# Patient Record
Sex: FEMALE | Race: WHITE | NOT HISPANIC OR LATINO | Employment: OTHER | URBAN - METROPOLITAN AREA
[De-identification: names, ages, dates, MRNs, and addresses within clinical notes are randomized per-mention and may not be internally consistent; named-entity substitution may affect disease eponyms.]

---

## 2020-10-26 ENCOUNTER — APPOINTMENT (OUTPATIENT)
Dept: ULTRASOUND IMAGING | Facility: CLINIC | Age: 40
End: 2020-10-26
Attending: EMERGENCY MEDICINE
Payer: COMMERCIAL

## 2020-10-26 ENCOUNTER — HOSPITAL ENCOUNTER (OUTPATIENT)
Facility: CLINIC | Age: 40
Setting detail: OBSERVATION
Discharge: HOME | End: 2020-10-27
Attending: EMERGENCY MEDICINE | Admitting: HOSPITALIST
Payer: COMMERCIAL

## 2020-10-26 DIAGNOSIS — R10.11 RUQ ABDOMINAL PAIN: ICD-10-CM

## 2020-10-26 DIAGNOSIS — K81.0 ACUTE CHOLECYSTITIS: Primary | ICD-10-CM

## 2020-10-26 DIAGNOSIS — G89.18 POST-OP PAIN: ICD-10-CM

## 2020-10-26 LAB
ANION GAP SERPL CALCULATED.3IONS-SCNC: 6 MMOL/L (ref 3–14)
BUN SERPL-MCNC: 10 MG/DL (ref 7–30)
CALCIUM SERPL-MCNC: 9 MG/DL (ref 8.5–10.1)
CHLORIDE SERPL-SCNC: 109 MMOL/L (ref 94–109)
CO2 SERPL-SCNC: 25 MMOL/L (ref 20–32)
CREAT SERPL-MCNC: 0.68 MG/DL (ref 0.52–1.04)
GFR SERPL CREATININE-BSD FRML MDRD: >90 ML/MIN/{1.73_M2}
GLUCOSE SERPL-MCNC: 99 MG/DL (ref 70–99)
HCG SERPL QL: NEGATIVE
LACTATE BLD-SCNC: 1.6 MMOL/L (ref 0.7–2)
LIPASE SERPL-CCNC: 214 U/L (ref 73–393)
POTASSIUM SERPL-SCNC: 3.8 MMOL/L (ref 3.4–5.3)
SODIUM SERPL-SCNC: 140 MMOL/L (ref 133–144)

## 2020-10-26 PROCEDURE — 96375 TX/PRO/DX INJ NEW DRUG ADDON: CPT

## 2020-10-26 PROCEDURE — 80048 BASIC METABOLIC PNL TOTAL CA: CPT | Performed by: EMERGENCY MEDICINE

## 2020-10-26 PROCEDURE — C9803 HOPD COVID-19 SPEC COLLECT: HCPCS

## 2020-10-26 PROCEDURE — 99219 PR INITIAL OBSERVATION CARE,LEVEL II: CPT | Performed by: HOSPITALIST

## 2020-10-26 PROCEDURE — 258N000003 HC RX IP 258 OP 636: Performed by: HOSPITALIST

## 2020-10-26 PROCEDURE — 83690 ASSAY OF LIPASE: CPT | Performed by: EMERGENCY MEDICINE

## 2020-10-26 PROCEDURE — 99285 EMERGENCY DEPT VISIT HI MDM: CPT | Mod: 25

## 2020-10-26 PROCEDURE — 84703 CHORIONIC GONADOTROPIN ASSAY: CPT | Performed by: EMERGENCY MEDICINE

## 2020-10-26 PROCEDURE — 76705 ECHO EXAM OF ABDOMEN: CPT

## 2020-10-26 PROCEDURE — 83605 ASSAY OF LACTIC ACID: CPT | Performed by: EMERGENCY MEDICINE

## 2020-10-26 PROCEDURE — 250N000011 HC RX IP 250 OP 636: Performed by: EMERGENCY MEDICINE

## 2020-10-26 PROCEDURE — 96374 THER/PROPH/DIAG INJ IV PUSH: CPT

## 2020-10-26 PROCEDURE — G0378 HOSPITAL OBSERVATION PER HR: HCPCS

## 2020-10-26 PROCEDURE — U0003 INFECTIOUS AGENT DETECTION BY NUCLEIC ACID (DNA OR RNA); SEVERE ACUTE RESPIRATORY SYNDROME CORONAVIRUS 2 (SARS-COV-2) (CORONAVIRUS DISEASE [COVID-19]), AMPLIFIED PROBE TECHNIQUE, MAKING USE OF HIGH THROUGHPUT TECHNOLOGIES AS DESCRIBED BY CMS-2020-01-R: HCPCS | Performed by: EMERGENCY MEDICINE

## 2020-10-26 PROCEDURE — 36415 COLL VENOUS BLD VENIPUNCTURE: CPT | Performed by: PHYSICIAN ASSISTANT

## 2020-10-26 RX ORDER — ONDANSETRON 2 MG/ML
4 INJECTION INTRAMUSCULAR; INTRAVENOUS EVERY 6 HOURS PRN
Status: DISCONTINUED | OUTPATIENT
Start: 2020-10-26 | End: 2020-10-27 | Stop reason: HOSPADM

## 2020-10-26 RX ORDER — AMOXICILLIN 250 MG
2 CAPSULE ORAL 2 TIMES DAILY
Status: DISCONTINUED | OUTPATIENT
Start: 2020-10-26 | End: 2020-10-27 | Stop reason: HOSPADM

## 2020-10-26 RX ORDER — PIPERACILLIN SODIUM, TAZOBACTAM SODIUM 4; .5 G/20ML; G/20ML
4.5 INJECTION, POWDER, LYOPHILIZED, FOR SOLUTION INTRAVENOUS ONCE
Status: COMPLETED | OUTPATIENT
Start: 2020-10-26 | End: 2020-10-26

## 2020-10-26 RX ORDER — PIPERACILLIN SODIUM, TAZOBACTAM SODIUM 3; .375 G/15ML; G/15ML
3.38 INJECTION, POWDER, LYOPHILIZED, FOR SOLUTION INTRAVENOUS EVERY 6 HOURS
Status: DISCONTINUED | OUTPATIENT
Start: 2020-10-27 | End: 2020-10-27 | Stop reason: HOSPADM

## 2020-10-26 RX ORDER — HYDROMORPHONE HYDROCHLORIDE 1 MG/ML
.2-.4 INJECTION, SOLUTION INTRAMUSCULAR; INTRAVENOUS; SUBCUTANEOUS
Status: COMPLETED | OUTPATIENT
Start: 2020-10-26 | End: 2020-10-27

## 2020-10-26 RX ORDER — AMOXICILLIN 250 MG
1 CAPSULE ORAL 2 TIMES DAILY
Status: DISCONTINUED | OUTPATIENT
Start: 2020-10-26 | End: 2020-10-27 | Stop reason: HOSPADM

## 2020-10-26 RX ORDER — IBUPROFEN 200 MG
600 TABLET ORAL EVERY 6 HOURS PRN
COMMUNITY

## 2020-10-26 RX ORDER — NALOXONE HYDROCHLORIDE 0.4 MG/ML
.1-.4 INJECTION, SOLUTION INTRAMUSCULAR; INTRAVENOUS; SUBCUTANEOUS
Status: DISCONTINUED | OUTPATIENT
Start: 2020-10-26 | End: 2020-10-27 | Stop reason: HOSPADM

## 2020-10-26 RX ORDER — SODIUM CHLORIDE 9 MG/ML
INJECTION, SOLUTION INTRAVENOUS CONTINUOUS
Status: DISCONTINUED | OUTPATIENT
Start: 2020-10-26 | End: 2020-10-27

## 2020-10-26 RX ORDER — ACETAMINOPHEN 325 MG/1
650 TABLET ORAL EVERY 4 HOURS PRN
Status: DISCONTINUED | OUTPATIENT
Start: 2020-10-26 | End: 2020-10-27 | Stop reason: HOSPADM

## 2020-10-26 RX ORDER — ONDANSETRON 4 MG/1
4 TABLET, ORALLY DISINTEGRATING ORAL EVERY 6 HOURS PRN
Status: DISCONTINUED | OUTPATIENT
Start: 2020-10-26 | End: 2020-10-27 | Stop reason: HOSPADM

## 2020-10-26 RX ORDER — ACETAMINOPHEN 650 MG/1
650 SUPPOSITORY RECTAL EVERY 4 HOURS PRN
Status: DISCONTINUED | OUTPATIENT
Start: 2020-10-26 | End: 2020-10-27 | Stop reason: HOSPADM

## 2020-10-26 RX ORDER — KETOROLAC TROMETHAMINE 15 MG/ML
15 INJECTION, SOLUTION INTRAMUSCULAR; INTRAVENOUS ONCE
Status: COMPLETED | OUTPATIENT
Start: 2020-10-26 | End: 2020-10-26

## 2020-10-26 RX ORDER — ACETAMINOPHEN 500 MG
1000 TABLET ORAL EVERY 6 HOURS PRN
Status: ON HOLD | COMMUNITY
End: 2020-10-27

## 2020-10-26 RX ADMIN — PIPERACILLIN SODIUM AND TAZOBACTAM SODIUM 4.5 G: 4; .5 INJECTION, POWDER, LYOPHILIZED, FOR SOLUTION INTRAVENOUS at 21:06

## 2020-10-26 RX ADMIN — KETOROLAC TROMETHAMINE 15 MG: 15 INJECTION, SOLUTION INTRAMUSCULAR; INTRAVENOUS at 19:25

## 2020-10-26 RX ADMIN — SODIUM CHLORIDE: 9 INJECTION, SOLUTION INTRAVENOUS at 22:27

## 2020-10-26 ASSESSMENT — ENCOUNTER SYMPTOMS
ABDOMINAL PAIN: 1
SHORTNESS OF BREATH: 0
DYSURIA: 0
NAUSEA: 0
FREQUENCY: 0
VOMITING: 0
SLEEP DISTURBANCE: 1
FEVER: 0

## 2020-10-26 ASSESSMENT — MIFFLIN-ST. JEOR: SCORE: 1418.39

## 2020-10-26 NOTE — ED PROVIDER NOTES
"  History     Chief Complaint:  Abdominal Pain     The history is provided by the patient.      Stephie Kaye is an otherwise healthy 40 year old female who presents for evaluation of intermittent episodes of right upper quadrant and epigastric abdominal pain starting a few months ago, again starting last night around 21 hours ago. The patient states that the pain is \"excruciating\" and had initially thought that this pain was due to diet and alcohol when she first began experiencing it, however, does not attribute either of these to her pain starting last night. This pain is not associated with eating. She has treated with ibuprofen and Tylenol this morning with little improvement. She notes difficulty sleeping due to the pain.     The patient denies any fever, chest pain, shortness of breath, dysuria, frequency, leg swelling, nausea and vomiting. The patient denies past abdominal surgeries outside of a  section. She notes no menstrual cycles due to an IUD in place. The patient was seen at an urgent care clinic where labs were obtained, as noted below, as well as a CT which showed gall stones and a distended gall bladder. She presents today under the recommendation from [facility] to receive an ultrasound.     Laboratory and Imaging Results from [facility]:   CBC: WBC: 13.1 (high), HGB: 13.8, PLT: 286  Hepatic Panel: WNL    CT Abdomen Pelvis Stone Protocol WO Contrast:  1. Distended gallbladder with cholelithiasis. I question wall thickening but there is no evidence of pericholecystic inflammatory change or pericholecystic fluid. Sonography is advised for further evaluation.   2. No calcified calculus. No evidence of current or recent obstructive uropathy. No specific visible you etiology for left upper quadrant pain.  Performed by Dr. Corona Steiner MD    Allergies:  No Known Drug Allergies     Medications:    IUD in place    Past Medical History:    History reviewed. No pertinent " "past medical history.    Past Surgical History:     section   [place] teeth extraction    Family History:    History reviewed. No pertinent family history.    Social History:  The patient presents to the ED alone.  Smoking Status: Light tobacco smoker  Smokeless Tobacco: Never Used  Alcohol Use: Yes, 10 glasses of wine per week  PCP: No Ref-Primary, Physician     Review of Systems   Constitutional: Negative for fever.   Respiratory: Negative for shortness of breath.    Cardiovascular: Negative for chest pain and leg swelling.   Gastrointestinal: Positive for abdominal pain. Negative for nausea and vomiting.   Genitourinary: Negative for dysuria and frequency.   Psychiatric/Behavioral: Positive for sleep disturbance (due to pain).   All other systems reviewed and are negative.    Physical Exam     Patient Vitals for the past 24 hrs:   BP Temp Temp src Resp SpO2 Height Weight   10/26/20 1711 118/81 98.6  F (37  C) Oral 18 100 % 1.702 m (5' 7\") 72.6 kg (160 lb)     Physical Exam  General: Alert, appears well-developed and well-nourished. Cooperative.     In mild distress  HEENT:  Head:  Atraumatic  Ears:  External ears are normal  Mouth/Throat:  Oropharynx is without erythema or exudate and mucous membranes are moist.   Eyes:   Conjunctivae normal and EOM are normal. No scleral icterus.  CV:  Normal rate, regular rhythm, normal heart sounds and radial pulses are 2+ and symmetric.  No murmur.  Resp:  Breath sounds are clear bilaterally    Non-labored, no retractions or accessory muscle use  GI:  Abdomen is soft, no distension, moderate RUQ tenderness. + leija's sign, no rebound or guarding.  No CVA tenderness bilaterally  MS:  Normal range of motion. No edema.    Normal strength in all 4 extremities.     Back atraumatic.    No midline cervical, thoracic, or lumbar tenderness  Skin:  Warm and dry.  No rash or lesions noted.  Neuro: Alert. Normal strength.  GCS: 15  Psych:  Normal mood and affect.    Emergency " Department Course     Imaging:  Radiology findings were communicated with the patient and admitting MD who voiced understanding of the findings.    US Abdomen Limited (RUQ):  Cholelithiasis with gallbladder wall thickening and positive sonographic Martin's sign, findings are suspicious for acute cholecystitis.  As per radiology.     Laboratory:  Laboratory findings were communicated with the patient and admitting MD who voiced understanding of the findings.    BMP: WNL (Creatinine: 0.68)    1902 Lactic Acid Whole Blood: 1.6     Lipase: 214    HCG qualitative Blood: Pending    Asymptomatic COVID-19 Virus (Coronavirus) PCR: Pending      Interventions:  1925 Toradol 15mg IV  2106 Zosyn 4.5g IV    Emergency Department Course:  Past medical records, nursing notes, and vitals reviewed.    1845 I performed an exam of the patient as documented above.     IV was inserted and blood was drawn for laboratory testing, results above.    The patient was sent for an ultrasound while in the emergency department, results above.     2043 I rechecked the patient and discussed the results of her workup thus far. I recommended admission at this time and the patient consented.     2045 I consulted with Dr. Covington, hospitalist, regarding the patient's history and presentation here in the emergency department who accepted the patient for admission.     Findings and plan explained to the patient who consents to admission. Discussed the patient with Dr. Covington, who will admit the patient to an observation bed for further monitoring, evaluation, and treatment.    I personally reviewed the laboratory and imaging results with the patient and answered all related questions prior to admission.     Impression & Plan     Covid-19  Stephie Kaye was evaluated during a global COVID-19 pandemic, which necessitated consideration that the patient might be at risk for infection with the SARS-CoV-2 virus that causes COVID-19.   Applicable protocols  for evaluation were followed during the patient's care.   COVID-19 was considered as part of the patient's evaluation. The plan for testing is:  a test was obtained during this visit.    Medical Decision Making:  Stephie Kaye is a 40 year old female who presents with symptoms consistent with acute cholecystitis in the setting of known gallstones from CT earlier today. Patient was sent here for ultrasound which ultimately has confirmed the presence of gallstones and wall thickening and the combined clinical, laboratory, and ultrasound evidence are consistent with cholecystitis.  There is no evidence of ascending cholangitis, gallstone pancreatitis or retained CBD stone. Blood work is unremarkable. There is no lower abdominal pain or tenderness to suggest appendicitis, diverticulitis, or other acute process. I have discussed the diagnosis with the patient and have answered all questions about the plan of care. The patient will be admitted for continued symptomatic care, IV antibiotics, and surgical consult for possible cholecystectomy.  The patient has remained stable throughout the ED course, and has had improved pain in the department.  I believe she is safe for admission to the floor at this time and Dr. Covington kindly agreed to take care of the patient here.     Diagnosis:    ICD-10-CM    1. Acute cholecystitis  K81.0 Asymptomatic COVID-19 Virus (Coronavirus) by PCR     HCG QUALitative pregnancy (blood)   2. RUQ abdominal pain  R10.11        Disposition:  Admitted to Dr. Covington.      Scribe Disclosure:  I, Terry Chandlerme, am serving as a scribe at 6:43 PM on 10/26/2020 to document services personally performed by Blas Bruno MD based on my observations and the provider's statements to me.      Blas Bruno MD  10/26/20 2512

## 2020-10-26 NOTE — ED TRIAGE NOTES
Patient states she went to urgent care and had a CT scan. Urgent care told the patient to come to the ED for an ultrasound of her gallbladder.

## 2020-10-27 ENCOUNTER — ANESTHESIA (OUTPATIENT)
Dept: SURGERY | Facility: CLINIC | Age: 40
End: 2020-10-27
Payer: COMMERCIAL

## 2020-10-27 ENCOUNTER — ANESTHESIA EVENT (OUTPATIENT)
Dept: SURGERY | Facility: CLINIC | Age: 40
End: 2020-10-27
Payer: COMMERCIAL

## 2020-10-27 ENCOUNTER — SURGERY (OUTPATIENT)
Age: 40
End: 2020-10-27
Payer: COMMERCIAL

## 2020-10-27 VITALS
TEMPERATURE: 98 F | HEART RATE: 74 BPM | SYSTOLIC BLOOD PRESSURE: 106 MMHG | DIASTOLIC BLOOD PRESSURE: 73 MMHG | HEIGHT: 67 IN | BODY MASS INDEX: 25.11 KG/M2 | RESPIRATION RATE: 16 BRPM | OXYGEN SATURATION: 96 % | WEIGHT: 160 LBS

## 2020-10-27 LAB
ALBUMIN SERPL-MCNC: 3.3 G/DL (ref 3.4–5)
ALBUMIN SERPL-MCNC: 3.3 G/DL (ref 3.4–5)
ALP SERPL-CCNC: 79 U/L (ref 40–150)
ALP SERPL-CCNC: 79 U/L (ref 40–150)
ALT SERPL W P-5'-P-CCNC: 20 U/L (ref 0–50)
ALT SERPL W P-5'-P-CCNC: 21 U/L (ref 0–50)
ANION GAP SERPL CALCULATED.3IONS-SCNC: 4 MMOL/L (ref 3–14)
AST SERPL W P-5'-P-CCNC: 14 U/L (ref 0–45)
AST SERPL W P-5'-P-CCNC: 17 U/L (ref 0–45)
BILIRUB DIRECT SERPL-MCNC: 0.2 MG/DL (ref 0–0.2)
BILIRUB SERPL-MCNC: 0.7 MG/DL (ref 0.2–1.3)
BILIRUB SERPL-MCNC: 0.9 MG/DL (ref 0.2–1.3)
BUN SERPL-MCNC: 11 MG/DL (ref 7–30)
CALCIUM SERPL-MCNC: 8.4 MG/DL (ref 8.5–10.1)
CHLORIDE SERPL-SCNC: 111 MMOL/L (ref 94–109)
CO2 SERPL-SCNC: 25 MMOL/L (ref 20–32)
CREAT SERPL-MCNC: 0.8 MG/DL (ref 0.52–1.04)
ERYTHROCYTE [DISTWIDTH] IN BLOOD BY AUTOMATED COUNT: 12.3 % (ref 10–15)
GFR SERPL CREATININE-BSD FRML MDRD: >90 ML/MIN/{1.73_M2}
GLUCOSE SERPL-MCNC: 91 MG/DL (ref 70–99)
HCT VFR BLD AUTO: 38 % (ref 35–47)
HGB BLD-MCNC: 12.8 G/DL (ref 11.7–15.7)
LABORATORY COMMENT REPORT: NORMAL
MCH RBC QN AUTO: 31.5 PG (ref 26.5–33)
MCHC RBC AUTO-ENTMCNC: 33.7 G/DL (ref 31.5–36.5)
MCV RBC AUTO: 94 FL (ref 78–100)
PLATELET # BLD AUTO: 267 10E9/L (ref 150–450)
POTASSIUM SERPL-SCNC: 4 MMOL/L (ref 3.4–5.3)
PROT SERPL-MCNC: 6.3 G/DL (ref 6.8–8.8)
PROT SERPL-MCNC: 6.4 G/DL (ref 6.8–8.8)
RBC # BLD AUTO: 4.06 10E12/L (ref 3.8–5.2)
SARS-COV-2 RNA SPEC QL NAA+PROBE: NEGATIVE
SARS-COV-2 RNA SPEC QL NAA+PROBE: NORMAL
SODIUM SERPL-SCNC: 140 MMOL/L (ref 133–144)
SPECIMEN SOURCE: NORMAL
SPECIMEN SOURCE: NORMAL
WBC # BLD AUTO: 7.4 10E9/L (ref 4–11)

## 2020-10-27 PROCEDURE — 370N000002 HC ANESTHESIA TECHNICAL FEE, EACH ADDTL 15 MIN: Performed by: SURGERY

## 2020-10-27 PROCEDURE — 250N000011 HC RX IP 250 OP 636: Performed by: PHYSICIAN ASSISTANT

## 2020-10-27 PROCEDURE — 250N000003 HC SEVOFLURANE, EA 15 MIN: Performed by: SURGERY

## 2020-10-27 PROCEDURE — 250N000009 HC RX 250: Performed by: SURGERY

## 2020-10-27 PROCEDURE — 80076 HEPATIC FUNCTION PANEL: CPT | Performed by: PHYSICIAN ASSISTANT

## 2020-10-27 PROCEDURE — 370N000001 HC ANESTHESIA TECHNICAL FEE, 1ST 30 MIN: Performed by: SURGERY

## 2020-10-27 PROCEDURE — 250N000009 HC RX 250: Performed by: ANESTHESIOLOGY

## 2020-10-27 PROCEDURE — 250N000011 HC RX IP 250 OP 636: Performed by: ANESTHESIOLOGY

## 2020-10-27 PROCEDURE — 250N000011 HC RX IP 250 OP 636: Performed by: HOSPITALIST

## 2020-10-27 PROCEDURE — 36415 COLL VENOUS BLD VENIPUNCTURE: CPT | Performed by: PHYSICIAN ASSISTANT

## 2020-10-27 PROCEDURE — 96375 TX/PRO/DX INJ NEW DRUG ADDON: CPT | Mod: 59

## 2020-10-27 PROCEDURE — 36415 COLL VENOUS BLD VENIPUNCTURE: CPT | Performed by: HOSPITALIST

## 2020-10-27 PROCEDURE — 96376 TX/PRO/DX INJ SAME DRUG ADON: CPT | Mod: 59

## 2020-10-27 PROCEDURE — 258N000003 HC RX IP 258 OP 636: Performed by: ANESTHESIOLOGY

## 2020-10-27 PROCEDURE — 80053 COMPREHEN METABOLIC PANEL: CPT | Performed by: HOSPITALIST

## 2020-10-27 PROCEDURE — 761N000001 HC RECOVERY PHASE 1 LEVEL 1 FIRST HR: Performed by: SURGERY

## 2020-10-27 PROCEDURE — 250N000009 HC RX 250: Performed by: PHYSICIAN ASSISTANT

## 2020-10-27 PROCEDURE — 85027 COMPLETE CBC AUTOMATED: CPT | Performed by: HOSPITALIST

## 2020-10-27 PROCEDURE — 258N000003 HC RX IP 258 OP 636: Performed by: NURSE ANESTHETIST, CERTIFIED REGISTERED

## 2020-10-27 PROCEDURE — 250N000009 HC RX 250: Performed by: NURSE ANESTHETIST, CERTIFIED REGISTERED

## 2020-10-27 PROCEDURE — 99217 PR OBSERVATION CARE DISCHARGE: CPT | Performed by: INTERNAL MEDICINE

## 2020-10-27 PROCEDURE — 47562 LAPAROSCOPIC CHOLECYSTECTOMY: CPT | Performed by: SURGERY

## 2020-10-27 PROCEDURE — 99204 OFFICE O/P NEW MOD 45 MIN: CPT | Mod: 57 | Performed by: SURGERY

## 2020-10-27 PROCEDURE — 360N000020 HC SURGERY LEVEL 3 1ST 30 MIN: Performed by: SURGERY

## 2020-10-27 PROCEDURE — 250N000011 HC RX IP 250 OP 636: Performed by: NURSE ANESTHETIST, CERTIFIED REGISTERED

## 2020-10-27 PROCEDURE — 88304 TISSUE EXAM BY PATHOLOGIST: CPT | Mod: TC | Performed by: SURGERY

## 2020-10-27 PROCEDURE — 47562 LAPAROSCOPIC CHOLECYSTECTOMY: CPT | Mod: AS | Performed by: PHYSICIAN ASSISTANT

## 2020-10-27 PROCEDURE — 272N000001 HC OR GENERAL SUPPLY STERILE: Performed by: SURGERY

## 2020-10-27 PROCEDURE — 88304 TISSUE EXAM BY PATHOLOGIST: CPT | Mod: 26 | Performed by: PATHOLOGY

## 2020-10-27 PROCEDURE — 360N000021 HC SURGERY LEVEL 3 EA 15 ADDTL MIN: Performed by: SURGERY

## 2020-10-27 PROCEDURE — G0378 HOSPITAL OBSERVATION PER HR: HCPCS

## 2020-10-27 PROCEDURE — 250N000013 HC RX MED GY IP 250 OP 250 PS 637: Performed by: PHYSICIAN ASSISTANT

## 2020-10-27 PROCEDURE — 999N000139 HC STATISTIC PRE-PROCEDURE ASSESSMENT II: Performed by: SURGERY

## 2020-10-27 RX ORDER — GLYCOPYRROLATE 0.2 MG/ML
INJECTION, SOLUTION INTRAMUSCULAR; INTRAVENOUS PRN
Status: DISCONTINUED | OUTPATIENT
Start: 2020-10-27 | End: 2020-10-27

## 2020-10-27 RX ORDER — NEOSTIGMINE METHYLSULFATE 1 MG/ML
VIAL (ML) INJECTION PRN
Status: DISCONTINUED | OUTPATIENT
Start: 2020-10-27 | End: 2020-10-27

## 2020-10-27 RX ORDER — FENTANYL CITRATE 50 UG/ML
INJECTION, SOLUTION INTRAMUSCULAR; INTRAVENOUS PRN
Status: DISCONTINUED | OUTPATIENT
Start: 2020-10-27 | End: 2020-10-27

## 2020-10-27 RX ORDER — AMOXICILLIN 250 MG
1 CAPSULE ORAL 2 TIMES DAILY
Qty: 15 TABLET | Refills: 0 | Status: SHIPPED | OUTPATIENT
Start: 2020-10-27

## 2020-10-27 RX ORDER — ONDANSETRON 2 MG/ML
4 INJECTION INTRAMUSCULAR; INTRAVENOUS EVERY 30 MIN PRN
Status: DISCONTINUED | OUTPATIENT
Start: 2020-10-27 | End: 2020-10-27 | Stop reason: HOSPADM

## 2020-10-27 RX ORDER — PROPOFOL 10 MG/ML
INJECTION, EMULSION INTRAVENOUS
Status: COMPLETED | OUTPATIENT
Start: 2020-10-27 | End: 2020-10-27

## 2020-10-27 RX ORDER — MEPERIDINE HYDROCHLORIDE 25 MG/ML
12.5 INJECTION INTRAMUSCULAR; INTRAVENOUS; SUBCUTANEOUS EVERY 5 MIN PRN
Status: DISCONTINUED | OUTPATIENT
Start: 2020-10-27 | End: 2020-10-27 | Stop reason: HOSPADM

## 2020-10-27 RX ORDER — HYDROMORPHONE HYDROCHLORIDE 1 MG/ML
.3-.5 INJECTION, SOLUTION INTRAMUSCULAR; INTRAVENOUS; SUBCUTANEOUS EVERY 5 MIN PRN
Status: DISCONTINUED | OUTPATIENT
Start: 2020-10-27 | End: 2020-10-27 | Stop reason: HOSPADM

## 2020-10-27 RX ORDER — SODIUM CHLORIDE, SODIUM LACTATE, POTASSIUM CHLORIDE, CALCIUM CHLORIDE 600; 310; 30; 20 MG/100ML; MG/100ML; MG/100ML; MG/100ML
INJECTION, SOLUTION INTRAVENOUS CONTINUOUS
Status: DISCONTINUED | OUTPATIENT
Start: 2020-10-27 | End: 2020-10-27 | Stop reason: HOSPADM

## 2020-10-27 RX ORDER — ONDANSETRON 2 MG/ML
INJECTION INTRAMUSCULAR; INTRAVENOUS PRN
Status: DISCONTINUED | OUTPATIENT
Start: 2020-10-27 | End: 2020-10-27

## 2020-10-27 RX ORDER — EPHEDRINE SULFATE 50 MG/ML
INJECTION, SOLUTION INTRAMUSCULAR; INTRAVENOUS; SUBCUTANEOUS PRN
Status: DISCONTINUED | OUTPATIENT
Start: 2020-10-27 | End: 2020-10-27

## 2020-10-27 RX ORDER — ONDANSETRON 4 MG/1
4 TABLET, ORALLY DISINTEGRATING ORAL EVERY 30 MIN PRN
Status: DISCONTINUED | OUTPATIENT
Start: 2020-10-27 | End: 2020-10-27 | Stop reason: HOSPADM

## 2020-10-27 RX ORDER — LIDOCAINE HYDROCHLORIDE 20 MG/ML
INJECTION, SOLUTION INFILTRATION; PERINEURAL PRN
Status: DISCONTINUED | OUTPATIENT
Start: 2020-10-27 | End: 2020-10-27

## 2020-10-27 RX ORDER — NALOXONE HYDROCHLORIDE 0.4 MG/ML
.1-.4 INJECTION, SOLUTION INTRAMUSCULAR; INTRAVENOUS; SUBCUTANEOUS
Status: DISCONTINUED | OUTPATIENT
Start: 2020-10-27 | End: 2020-10-27

## 2020-10-27 RX ORDER — HYDROCODONE BITARTRATE AND ACETAMINOPHEN 5; 325 MG/1; MG/1
1-2 TABLET ORAL EVERY 4 HOURS PRN
Status: DISCONTINUED | OUTPATIENT
Start: 2020-10-27 | End: 2020-10-27 | Stop reason: HOSPADM

## 2020-10-27 RX ORDER — DEXAMETHASONE SODIUM PHOSPHATE 4 MG/ML
INJECTION, SOLUTION INTRA-ARTICULAR; INTRALESIONAL; INTRAMUSCULAR; INTRAVENOUS; SOFT TISSUE PRN
Status: DISCONTINUED | OUTPATIENT
Start: 2020-10-27 | End: 2020-10-27

## 2020-10-27 RX ORDER — PROPOFOL 10 MG/ML
INJECTION, EMULSION INTRAVENOUS PRN
Status: DISCONTINUED | OUTPATIENT
Start: 2020-10-27 | End: 2020-10-27

## 2020-10-27 RX ORDER — MAGNESIUM HYDROXIDE 1200 MG/15ML
LIQUID ORAL PRN
Status: DISCONTINUED | OUTPATIENT
Start: 2020-10-27 | End: 2020-10-27 | Stop reason: HOSPADM

## 2020-10-27 RX ORDER — PROPOFOL 10 MG/ML
INJECTION, EMULSION INTRAVENOUS CONTINUOUS PRN
Status: DISCONTINUED | OUTPATIENT
Start: 2020-10-27 | End: 2020-10-27

## 2020-10-27 RX ORDER — BUPIVACAINE HYDROCHLORIDE AND EPINEPHRINE 2.5; 5 MG/ML; UG/ML
INJECTION, SOLUTION INFILTRATION; PERINEURAL PRN
Status: DISCONTINUED | OUTPATIENT
Start: 2020-10-27 | End: 2020-10-27 | Stop reason: HOSPADM

## 2020-10-27 RX ORDER — FENTANYL CITRATE 50 UG/ML
25-50 INJECTION, SOLUTION INTRAMUSCULAR; INTRAVENOUS
Status: DISCONTINUED | OUTPATIENT
Start: 2020-10-27 | End: 2020-10-27 | Stop reason: HOSPADM

## 2020-10-27 RX ORDER — HYDROCODONE BITARTRATE AND ACETAMINOPHEN 5; 325 MG/1; MG/1
1-2 TABLET ORAL EVERY 4 HOURS PRN
Qty: 15 TABLET | Refills: 0 | Status: SHIPPED | OUTPATIENT
Start: 2020-10-27

## 2020-10-27 RX ADMIN — ONDANSETRON 4 MG: 2 INJECTION INTRAMUSCULAR; INTRAVENOUS at 10:46

## 2020-10-27 RX ADMIN — HYDROMORPHONE HYDROCHLORIDE 0.3 MG: 1 INJECTION, SOLUTION INTRAMUSCULAR; INTRAVENOUS; SUBCUTANEOUS at 12:37

## 2020-10-27 RX ADMIN — PROPOFOL 200 MG: 10 INJECTION, EMULSION INTRAVENOUS at 09:51

## 2020-10-27 RX ADMIN — GLYCOPYRROLATE 0.6 MG: 0.2 INJECTION, SOLUTION INTRAMUSCULAR; INTRAVENOUS at 10:48

## 2020-10-27 RX ADMIN — SUCCINYLCHOLINE CHLORIDE 100 MG: 20 INJECTION, SOLUTION INTRAMUSCULAR; INTRAVENOUS; PARENTERAL at 09:52

## 2020-10-27 RX ADMIN — HYDROMORPHONE HYDROCHLORIDE 0.3 MG: 1 INJECTION, SOLUTION INTRAMUSCULAR; INTRAVENOUS; SUBCUTANEOUS at 00:21

## 2020-10-27 RX ADMIN — SODIUM CHLORIDE, POTASSIUM CHLORIDE, SODIUM LACTATE AND CALCIUM CHLORIDE: 600; 310; 30; 20 INJECTION, SOLUTION INTRAVENOUS at 09:32

## 2020-10-27 RX ADMIN — PROPOFOL 200 MG: 10 INJECTION, EMULSION INTRAVENOUS at 09:53

## 2020-10-27 RX ADMIN — PIPERACILLIN SODIUM AND TAZOBACTAM SODIUM 3.38 G: 3; .375 INJECTION, POWDER, LYOPHILIZED, FOR SOLUTION INTRAVENOUS at 09:29

## 2020-10-27 RX ADMIN — LIDOCAINE HYDROCHLORIDE 100 MG: 20 INJECTION, SOLUTION INFILTRATION; PERINEURAL at 09:51

## 2020-10-27 RX ADMIN — HYDROMORPHONE HYDROCHLORIDE 0.3 MG: 1 INJECTION, SOLUTION INTRAMUSCULAR; INTRAVENOUS; SUBCUTANEOUS at 16:35

## 2020-10-27 RX ADMIN — PROPOFOL 50 MCG/KG/MIN: 10 INJECTION, EMULSION INTRAVENOUS at 09:53

## 2020-10-27 RX ADMIN — Medication 10 MG: at 10:07

## 2020-10-27 RX ADMIN — PIPERACILLIN SODIUM AND TAZOBACTAM SODIUM 3.38 G: 3; .375 INJECTION, POWDER, LYOPHILIZED, FOR SOLUTION INTRAVENOUS at 03:23

## 2020-10-27 RX ADMIN — Medication 10 MG: at 09:59

## 2020-10-27 RX ADMIN — DEXAMETHASONE SODIUM PHOSPHATE 4 MG: 4 INJECTION, SOLUTION INTRA-ARTICULAR; INTRALESIONAL; INTRAMUSCULAR; INTRAVENOUS; SOFT TISSUE at 10:07

## 2020-10-27 RX ADMIN — NEOSTIGMINE METHYLSULFATE 4 MG: 1 INJECTION, SOLUTION INTRAVENOUS at 10:48

## 2020-10-27 RX ADMIN — BUPIVACAINE HYDROCHLORIDE AND EPINEPHRINE BITARTRATE 30 ML: 2.5; .005 INJECTION, SOLUTION EPIDURAL; INFILTRATION; INTRACAUDAL; PERINEURAL at 10:48

## 2020-10-27 RX ADMIN — SODIUM CHLORIDE, POTASSIUM CHLORIDE, SODIUM LACTATE AND CALCIUM CHLORIDE: 600; 310; 30; 20 INJECTION, SOLUTION INTRAVENOUS at 10:44

## 2020-10-27 RX ADMIN — ROCURONIUM BROMIDE 20 MG: 10 INJECTION INTRAVENOUS at 10:11

## 2020-10-27 RX ADMIN — FAMOTIDINE 20 MG: 10 INJECTION INTRAVENOUS at 12:37

## 2020-10-27 RX ADMIN — ROCURONIUM BROMIDE 20 MG: 10 INJECTION INTRAVENOUS at 09:53

## 2020-10-27 RX ADMIN — HYDROCODONE BITARTRATE AND ACETAMINOPHEN 1 TABLET: 5; 325 TABLET ORAL at 14:30

## 2020-10-27 RX ADMIN — SODIUM CHLORIDE 1000 ML: 900 IRRIGANT IRRIGATION at 10:48

## 2020-10-27 RX ADMIN — SUCCINYLCHOLINE CHLORIDE 100 MG: 20 INJECTION, SOLUTION INTRAMUSCULAR; INTRAVENOUS; PARENTERAL at 09:53

## 2020-10-27 RX ADMIN — MIDAZOLAM 2 MG: 1 INJECTION INTRAMUSCULAR; INTRAVENOUS at 09:45

## 2020-10-27 RX ADMIN — DEXMEDETOMIDINE HYDROCHLORIDE 20 MCG: 100 INJECTION, SOLUTION INTRAVENOUS at 10:16

## 2020-10-27 RX ADMIN — FENTANYL CITRATE 100 MCG: 50 INJECTION, SOLUTION INTRAMUSCULAR; INTRAVENOUS at 09:51

## 2020-10-27 RX ADMIN — PIPERACILLIN SODIUM AND TAZOBACTAM SODIUM 3.38 G: 3; .375 INJECTION, POWDER, LYOPHILIZED, FOR SOLUTION INTRAVENOUS at 14:30

## 2020-10-27 NOTE — OP NOTE
Surgeon: Broderick Castillo MD  1st Assistant: Nani Monroe PA-C, The physicians assistant was medically necessary for their expertise in camera management, suctioning, suturing, and retraction.  PREOPERATIVE DIAGNOSIS: acute cholecystitis.   POSTOPERATIVE DIAGNOSES: Same  PROCEDURE: Laparoscopic cholecystectomy.   ANESTHESIA: General.   ESTIMATED BLOOD LOSS: Less than 5 mL.   OPERATIVE PROCEDURE: After induction of general endotracheal anesthesia, Stephie Badillo Vargas abdomen was prepped and draped in the usual sterile fashion. With the Storm technique in an periumbilical location, the abdomen was entered and pneumoperitoneum was established. Three additional 5 mm trocars were placed along the right hypochondrium. The gallbladder fundus was retracted cephalad and lateral and the infundibulum was retracted caudad and lateral. The peritoneum investing the triangle of Calot was incised with electrocautery and dissected bluntly. The critical view of a single cystic duct, single cystic artery was achieved and both structures were triply and doubly clipped on the patient's side, singly clipped on the gallbladder side and transected sharply. The gallbladder was detached from the liver with electrocautery and blunt dissection. The specimen was removed from the patient's abdomen and sent to pathology. The gallbladder fossa was inspected. Hemostasis was secured, and no evidence of bile leakage noted. The abdomen was surveyed and no other pathology seen. The trocars were then removed under direct visualization without evidence of bleeding. Periumbilical port was closed with multiple interrupted 0 Vicryl suture. Skin was approximated with absorbable sutures. Steri-Strips and sterile dressing applied. No immediate complications.   BRODERICK CASTILLO MD

## 2020-10-27 NOTE — ANESTHESIA POSTPROCEDURE EVALUATION
Patient: Stephie Kaye    Procedure(s):  CHOLECYSTECTOMY, LAPAROSCOPIC    Diagnosis:Acute cholecystitis [K81.0]  Diagnosis Additional Information: No value filed.    Anesthesia Type:  General    Note:  Anesthesia Post Evaluation    Patient location during evaluation: bedside  Patient participation: Able to fully participate in evaluation  Level of consciousness: awake  Pain management: adequate  Airway patency: patent  Cardiovascular status: acceptable  Respiratory status: acceptable  Hydration status: acceptable  PONV: none     Anesthetic complications: None    Comments: No anesthetic complications noted.         Last vitals:  Vitals:    10/27/20 1310 10/27/20 1330 10/27/20 1349   BP: 99/88 99/85 98/60   Pulse: 63 62 60   Resp:      Temp:      SpO2:            Electronically Signed By: Abhijit Larkin DO, DO  October 27, 2020  2:19 PM

## 2020-10-27 NOTE — CONSULTS
General Surgery Consultation    Stephie Kaye MRN#: [id number]   Age: 40 year old YOB: 1980     The surgical service was consulted by Dr. Covington to evaluate and/or treat this patient for cholecystitis.    Date of Admission:          10/26/2020       Chief Complaint:     Chief Complaint   Patient presents with     Abdominal Pain          History of Present Illness:   This patient is a 40 year old female who presented to the [facility] ER with RUQ and epigastric abdominal pain 2 nights ago.  She denies fever, chills, nausea, vomiting, change in BM or urination.  She reports similar episodes within the last few months and believes it is related to eating and drinking.  The patient's abdominal surgeries include a .  She has a family hx of gallbladder disease.  The history is obtained from the patient and chart. The patient is NPO.    COVID result: negative 10/26         Past Medical History:   None         Past Surgical History:   C section  [place] teeth extraction         Medications:     Prior to Admission medications    Medication Sig Start Date End Date Taking? Authorizing Provider   acetaminophen (TYLENOL) 500 MG tablet Take 1,000 mg by mouth every 6 hours as needed for mild pain   Yes Unknown, Entered By History   ibuprofen (ADVIL/MOTRIN) 200 MG tablet Take 600 mg by mouth every 6 hours as needed for mild pain   Yes Unknown, Entered By History          Current Medications:           [Auto Hold] famotidine  20 mg Intravenous Q12H     [Auto Hold] piperacillin-tazobactam  3.375 g Intravenous Q6H     [Auto Hold] senna-docusate  1 tablet Oral BID    Or     [Auto Hold] senna-docusate  2 tablet Oral BID     [Auto Hold] acetaminophen, [Auto Hold] acetaminophen, bupivacaine 0.25 % - EPINEPHrine 1:200,000, fentaNYL, [Auto Hold] HYDROmorphone, HYDROmorphone, [Auto Hold] melatonin, meperidine, [Auto Hold] naloxone, ondansetron **OR** ondansetron, [Auto Hold] ondansetron **OR**  "[Auto Hold] ondansetron, prochlorperazine, sodium chloride 0.9% (bottle)         Allergies:   No Known Allergies          Social History:     Social History     Tobacco Use     Smoking status: Not on file   Substance Use Topics     Alcohol use: Not on file          Family History:   Gallbladder disease and DM.         Review of Systems:   The 12 point Review of Systems is negative other than noted in the HPI.         Physical Exam:   Blood pressure 107/66, pulse 60, temperature 98.2  F (36.8  C), temperature source Temporal, resp. rate 16, height 1.702 m (5' 7\"), weight 72.6 kg (160 lb), SpO2 98 %.  I/O last 3 completed shifts:  In: 667 [I.V.:667]  Out: -   General - This is a well developed, well nourished female in no apparent distress.  HEENT - Normocephalic. Atraumatic. Moist mucous membranes. Pupils equal.  No scleral icterus.  Neck - Supple without masses or lymphadenopathy.  Lungs - Clear to ascultation bilaterally without crackles or wheezing.    Heart - Regular rate & rhythm without murmur.  Abdomen - Soft, ttp RUQ and epigastric region, non-distended, +bowel sounds, no masses or organomegaly.  Extremities - Moves all extremities. No edema.  Neurologic - Nonfocal.  Skin - Warm and dry.          Data:   Labs:    Recent Labs   Lab Test 10/27/20  0842 10/26/20  1902   POTASSIUM 4.0 3.8   CHLORIDE 111* 109   CO2 25 25   BUN 11 10   CR 0.80 0.68     Recent Labs   Lab Test 10/27/20  0842 10/26/20  1902   BILITOTAL 0.9  0.7  --    DBIL 0.2  --    ALT 21  20  --    AST 14  17  --    ALKPHOS 79  79  --    LIPASE  --  214     No lab results found.     Ultrasound of the abdomen: Large shadowing gallstone, with layering sludge.  Gallbladder wall thickening measuring up to 1 cm. Unclear if Martin's sign +/-.  Pt reports she was sore during U/S.         Assessment:   Acute calculous cholecystitis         Plan:   - Plan for lap alexus today.  Procedure, risks, and recovery period briefly discussed with patient. Surgeon " to discuss further.  Patient agrees with plan.  - Pre op orders in chart  - NPO, IVF, IV pain control, on Zosyn  - Pepcid  - Medical management per hospitalist, appreciate your help  - Return to floor and discharge later today    Thank you very much for this consult.     I have discussed the history, physical, and plan with Dr. Rojas, who has independently interviewed and examined the patient and agrees with the plan as stated.     MATY MilesC  [facility]  [phone]

## 2020-10-27 NOTE — PLAN OF CARE
Discharge    Patient discharged to home via wheelchair with mother.   Care plan note: Pt A&Ox4, calm and cooperative. Pain rate 8/10, IV dilaudid requested, MD aware. Okay with discharge. Pt ambulated to bathroom, denies SOB. Lap sites x 4, CDI. Pt states verbal understanding of discharge plan, instructions and medications.     Listed belongings gathered and returned to patient. Yes  Care Plan and Patient education resolved: Yes  Prescriptions if needed, hard copies sent with patient  NA  Home and hospital acquired medications returned to patient: Yes  Medication Bin checked and emptied on discharge Yes  Follow up appointment made for patient: No - Surgery will call in 2 weeks to follow up with patients.

## 2020-10-27 NOTE — PROGRESS NOTES
RECEIVING UNIT ED HANDOFF REVIEW    ED Nurse Handoff Report was reviewed by: Yesica Torrez RN on October 26, 2020 at 9:30 PM

## 2020-10-27 NOTE — ANESTHESIA PROCEDURE NOTES
Airway   Date/Time: 10/27/2020 9:53 AM        Staff -   Performed By: CRNA    Indications and Patient Condition  Indications for airway management: airway protection  Induction type:RSIMask difficulty assessment: 0 - not attempted    Final Airway Details  Final airway type: endotracheal airway  Successful airway:ETT - single  Endotracheal Airway Details   ETT size (mm): 7.0  Cuffed: yes  Successful intubation technique: direct laryngoscopy  Grade View of Cords: 1  Adjucts: stylet  Measured from: lips  Secured at (cm): 22  Bite block used: None    Post intubation assessment   Placement verified by: capnometry   Number of attempts at approach: 1  Ease of procedure: easy  Dentition: Intact    Medications Administered  Propofol (DIPRIVAN) injection 10 mg/mL vial, 200 mg  succinylcholine (ANECTINE) 20 mg/mL injection, 100 mg  rocuronium (ZEMURON) 10 mg/mL injection, 20 mg

## 2020-10-27 NOTE — PLAN OF CARE
Summary:     DATE & TIME: 10/26/20  0572-2136  Cognitive Concerns/ Orientation : A&Ox4   BEHAVIOR & AGGRESSION TOOL COLOR: green  CIWA SCORE: na   ABNL VS/O2: na  MOBILITY: Ind  PAIN MANAGEMENT: 3 declined pain meds  DIET: NPO ex ice chips  BOWEL/BLADDER: continent  ABNL LAB/BG:  na  DRAIN/DEVICES: na  TELEMETRY RHYTHM: na  SKIN: intact  TESTS/PROCEDURES: NPO for possible surgery  D/C DAY/GOALS/PLACE: d  OTHER IMPORTANT INFO: Arrived from ED at 2200

## 2020-10-27 NOTE — ANESTHESIA PREPROCEDURE EVALUATION
"Anesthesia Pre-Procedure Evaluation    Patient: Stephie Badillo Vargas   MRN: [id number] : 1980          Preoperative Diagnosis: Acute cholecystitis [K81.0]    Procedure(s):  CHOLECYSTECTOMY, LAPAROSCOPIC    No past medical history on file.  No past surgical history on file.    Anesthesia Evaluation     .             ROS/MED HX    ENT/Pulmonary:      (-) sleep apnea   Neurologic:  - neg neurologic ROS     Cardiovascular:        (-) dyslipidemia   METS/Exercise Tolerance:     Hematologic:  - neg hematologic  ROS       Musculoskeletal:  - neg musculoskeletal ROS       GI/Hepatic:     (+) cholecystitis/cholelithiasis,      (-) GERD   Renal/Genitourinary:  - ROS Renal section negative       Endo:  - neg endo ROS       Psychiatric:  - neg psychiatric ROS       Infectious Disease:  - neg infectious disease ROS       Malignancy:         Other:                          Physical Exam  Normal systems: cardiovascular, pulmonary and dental    Airway   Mallampati: II  TM distance: >3 FB  Neck ROM: full    Dental     Cardiovascular       Pulmonary             Lab Results   Component Value Date     10/26/2020    POTASSIUM 3.8 10/26/2020    CHLORIDE 109 10/26/2020    CO2 25 10/26/2020    BUN 10 10/26/2020    CR 0.68 10/26/2020    GLC 99 10/26/2020    JOAN 9.0 10/26/2020    LIPASE 214 10/26/2020    HCGS Negative 10/26/2020       Preop Vitals  BP Readings from Last 3 Encounters:   10/27/20 98/63    Pulse Readings from Last 3 Encounters:   10/27/20 74      Resp Readings from Last 3 Encounters:   10/27/20 18    SpO2 Readings from Last 3 Encounters:   10/27/20 96%      Temp Readings from Last 1 Encounters:   10/27/20 36.9  C (98.5  F) (Oral)    Ht Readings from Last 1 Encounters:   10/26/20 1.702 m (5' 7\")      Wt Readings from Last 1 Encounters:   10/26/20 72.6 kg (160 lb)    Estimated body mass index is 25.06 kg/m  as calculated from the following:    Height as of this encounter: 1.702 m (5' 7\").    Weight as of this " encounter: 72.6 kg (160 lb).       Anesthesia Plan      History & Physical Review  History and physical reviewed and following examination; no interval change.    ASA Status:  1 .    NPO Status:  > 8 hours    Plan for General (ETT RSI) with Intravenous induction. Maintenance will be Balanced.    PONV prophylaxis:  Ondansetron (or other 5HT-3) and Dexamethasone or Solumedrol         Postoperative Care  Postoperative pain management:  IV analgesics.      Consents  Anesthetic plan, risks, benefits and alternatives discussed with:  Patient..                 Abhijit Larkin DO, DO

## 2020-10-27 NOTE — DISCHARGE SUMMARY
[facility]    Discharge Summary  Hospitalist    Date of Admission:  10/26/2020  Date of Discharge:  10/27/2020  Discharging Provider: Yeimy Bruno    Discharge Diagnoses   Acute cholecystitis, s/p lap alexus on 10/27/20    History of Present Illness   Stephie Kaye is an 40 year old female with no significant PMHx who was admitted on 10/26/2020 for evaluation of RUQ pain dt acute cholecystitis.     Hospital Course   Stephie Kaye was admitted on 10/26/2020.  The following problems were addressed during her hospitalization:    Acute Cholecystitis, s/p lap alexus on 10/27/20  Presented to [facility] with complaint of RUQ pain. No nausea/vomiting, fevers or changes in bowel habits. Afebrile and hemodynamically stable. WBC 13. Lactate nl. LFTs nl. CT abd/pelvis obtained and showed findings of a distended gallbladder with cholecystitis with suspected wall thickening. Admitted to [facility]. Started on IVFs and Zosyn. WBC normalized. Seen by general surgery and underwent lap alexus on 10/27/20. Tolerated procedure well. Postop pain managed. Taking po.     Discharged home on POD #0. Follow up with PCP and general surgery    Yeimy Bruno, DO    Significant Results and Procedures   Date of Surgery: 10/27/2020  Surgeon: Dr. Bennie Rojas MD    Preop diagnosis: Acute cholecystitis  Postop diagnosis: Acute cholecystitis  Procedure: Laparoscopic cholecystectomy    Pending Results   These results will be followed up by general surgery  Unresulted Labs Ordered in the Past 30 Days of this Admission     Date and Time Order Name Status Description    10/27/2020 1045 Surgical pathology exam In process           Code Status   Full Code       Primary Care Physician   Physician No Ref-Primary    Physical Exam   Temp: 97.8  F (36.6  C) Temp src: Oral BP: 98/61 Pulse: 64   Resp: 16 SpO2: 98 % O2 Device: Nasal cannula Oxygen Delivery: 2 LPM  Vitals:    10/26/20 1711   Weight: 72.6 kg  (160 lb)     Vital Signs with Ranges  Temp:  [97.6  F (36.4  C)-99.4  F (37.4  C)] 97.8  F (36.6  C)  Pulse:  [57-81] 64  Resp:  [12-22] 16  BP: ()/(60-81) 98/61  SpO2:  [96 %-100 %] 98 %  I/O last 3 completed shifts:  In: 667 [I.V.:667]  Out: -     General: Resting comfortably, alert, conversive, NAD  CVS: HRRR, no MGR, no LE edema  Respiratory: CTAB, no wheeze/rales/rhonchi, no increased work of breathing  GI: S, mildly TTP, +BS  Skin: Warm/dry  Neuro: CNs 2-23 intact, no focal motor/sensory deficits, gait not assessed    Discharge Disposition   Discharged to home  Condition at discharge: Stable    Consultations This Hospital Stay   SURGERY GENERAL IP CONSULT    Time Spent on this Encounter   IYeimy DO, personally saw the patient today and spent greater than 30 minutes discharging this patient.    Discharge Orders      Follow-up and recommended labs and tests     Follow up with general surgery as advised.     Reason for your hospital stay    Management of the inflammation of your gallbladder for which you underwent surgery to remove it (called a laparoscopic cholecystectomy).     Activity    Your activity upon discharge: activity as tolerated     Diet    Follow this diet upon discharge: Regular     Discharge Medications   Current Discharge Medication List      START taking these medications    Details   HYDROcodone-acetaminophen (NORCO) 5-325 MG tablet Take 1-2 tablets by mouth every 4 hours as needed for severe pain  Qty: 15 tablet, Refills: 0    Associated Diagnoses: Post-op pain      senna-docusate (SENOKOT-S/PERICOLACE) 8.6-50 MG tablet Take 1 tablet by mouth 2 times daily  Qty: 15 tablet, Refills: 0    Associated Diagnoses: Post-op pain         CONTINUE these medications which have NOT CHANGED    Details   ibuprofen (ADVIL/MOTRIN) 200 MG tablet Take 600 mg by mouth every 6 hours as needed for mild pain         STOP taking these medications       acetaminophen (TYLENOL) 500 MG tablet  Comments:   Reason for Stopping:             Allergies   No Known Allergies     Data   Most Recent 3 CBC's:  Recent Labs   Lab Test 10/27/20  0842   WBC 7.4   HGB 12.8   MCV 94         Most Recent 3 BMP's:  Recent Labs   Lab Test 10/27/20  0842 10/26/20  1902    140   POTASSIUM 4.0 3.8   CHLORIDE 111* 109   CO2 25 25   BUN 11 10   CR 0.80 0.68   ANIONGAP 4 6   JOAN 8.4* 9.0   GLC 91 99     Most Recent 2 LFT's:  Recent Labs   Lab Test 10/27/20  0842   AST 14  17   ALT 21  20   ALKPHOS 79  79   BILITOTAL 0.9  0.7     Results for orders placed or performed during the hospital encounter of 10/26/20   US Abdomen Limited (RUQ)    Narrative    US ABDOMEN LIMITED   10/26/2020 8:21 PM     HISTORY:  Right upper quadrant abdominal pain, CT confirmed  gallstones. Concern for cholecystitis.    COMPARISON: None.    FINDINGS:    Gallbladder: Large shadowing gallstone, with layering sludge.  Gallbladder wall thickening measuring up to 1 cm. Sonographic Martin's  sign is negative.    Bile ducts:  CHD is normal diameter.  No intrahepatic biliary  dilatation. The distal portion of the common bile duct is obscured by  overlying bowel gas.    Liver: Demonstrates normal parenchymal echogenicity. No focal hepatic  mass is visualized.    Pancreas:  Partially obscured by overlying bowel gas,  but grossly  unremarkable.     Right kidney:  No hydronephrosis or shadowing calculi.    Aorta and IVC:  Not specifically assessed.       Impression    IMPRESSION:  Cholelithiasis with gallbladder wall thickening and  positive sonographic Martin's sign, findings are suspicious for acute  cholecystitis.    NEDA CRUZ MD

## 2020-10-27 NOTE — H&P
[facility]    History and Physical - Hospitalist Service       Date of Admission:  10/26/2020    Assessment & Plan   Stephie Kaye is a 40 year old female with minimal past medical history who presented to the ED with post prandial RUQ pain. RUQ US showed cholelithiasis and wall thickening. She is registered to observation, npo, receiving zosyn, and general surgery is consulted.    Acute cholecystitis   RUQ pain after eating/drinking last weekend and this weekend. No nausea, vomiting, diarrhea, fevers or chills. RUQ US confirmed, outside CT showed signs also. WBC 13K at [facility]. Lactic acid 1.6. Ketorolac in ED provided good pain relief. HCG negative.  - NPO  - IVF  - continue zosyn  - general surgery consultation  - prn analgesia    Asymptomatic COVID 19 screening  - pre-procedural swab ordered in ED      Diet:  npo   DVT Prophylaxis: Pneumatic Compression Devices and Ambulate every shift  Tellez Catheter: not present  Code Status:   Full Code          Disposition Plan   Expected discharge: possibly tomorrow pending surgery consult and likely OR  Entered: Channing Covington MD 10/26/2020, 9:24 PM     The patient's care was discussed with the Patient and ED MD.    Channing Covington MD  [facility]  Contact information available via [facility] Paging/Directory      ______________________________________________________________________    Chief Complaint   RUQ pain    History is obtained from the patient    History of Present Illness    Stephie Kaye is a very pleasant 40 year old female with minimal past medical history who presented to the ED with post prandial RUQ pain.  It started after eating/drinking last weekend and then again this weekend.  There was no associated nausea, vomiting, diarrhea, fevers or chills.  Pain does not radiate.  She denies any change in bowel habits or dysuria.  She has not had any coughing, sore throat, loss of taste or smell, or  sick contacts.  She does not take any medications regularly.  She initially went to [facility] urgent care with CT showing distended gallbladder with cholelithiasis and possible wall thickening.  Ultrasound was recommended and patient presented here at [facility].    In the ED patient was seen by Dr. Blas Bruno with whom I discussed the case.  Patient is afebrile, hemodynamically stable, and saturating normally on room air.  Urgent care CBC showed a leukocytosis of 13.1, Hgb 13.8, .  Hepatic panel is reportedly within normal limits.  Our BMP was unremarkable.  Ultrasound here showed gallbladder wall thickening and cholelithiasis with a positive sonographic Martin sign.  Patient has received IV fluids, ketorolac, and she was started on IV Zosyn.  She will be registered to observation with general surgery consultation requested.  Asymptomatic COVID-19 screening is pending.      Review of Systems    The 10 point Review of Systems is negative other than noted in the HPI or here.     Past Medical History    I have reviewed this patient's medical history and updated it with pertinent information if needed.   C section    Past Surgical History   I have reviewed this patient's surgical history and updated it with pertinent information if needed.  No past surgical history on file.    Social History   I have reviewed this patient's social history and updated it with pertinent information if needed.  Social History     Tobacco Use     Smoking status: Not on file   Substance Use Topics     Alcohol use: Not on file     Drug use: Not on file       Family History     No early CAD.     Prior to Admission Medications   Prior to Admission Medications   Prescriptions Last Dose Informant Patient Reported? Taking?   acetaminophen (TYLENOL) 500 MG tablet 10/25/2020 at hs  Yes Yes   Sig: Take 1,000 mg by mouth every 6 hours as needed for mild pain   ibuprofen (ADVIL/MOTRIN) 200 MG tablet 10/25/2020 at hs  Yes  Yes   Sig: Take 600 mg by mouth every 6 hours as needed for mild pain      Facility-Administered Medications: None     Allergies   No Known Allergies    Physical Exam   Vital Signs: Temp: 98.6  F (37  C) Temp src: Oral BP: 118/81     Resp: 18 SpO2: 100 %      Weight: 160 lbs 0 oz      Gen: NAD, pleasant  HEENT: Normocephalic, EOMI, MMM  Resp: no crackles,  no wheezes, no increased work of resp  CV: S1S2 heard, reg rhythm, reg rate, no pedal edema  Abdo: soft, nontender, nondistended, bowel sounds present  Ext: calves nontender, well perfused  Neuro: AAOx3, CN grossly intact, no facial asymmetry      Data   Data reviewed today: I reviewed all medications, new labs and imaging results over the last 24 hours. I personally reviewed no images or EKG's today.    Recent Labs   Lab 10/26/20  1902      POTASSIUM 3.8   CHLORIDE 109   CO2 25   BUN 10   CR 0.68   ANIONGAP 6   JOAN 9.0   GLC 99   LIPASE 214     Recent Results (from the past 24 hour(s))   US Abdomen Limited (RUQ)    Narrative    US ABDOMEN LIMITED   10/26/2020 8:21 PM     HISTORY:  Right upper quadrant abdominal pain, CT confirmed  gallstones. Concern for cholecystitis.    COMPARISON: None.    FINDINGS:    Gallbladder: Large shadowing gallstone, with layering sludge.  Gallbladder wall thickening measuring up to 1 cm. Sonographic Martin's  sign is negative.    Bile ducts:  CHD is normal diameter.  No intrahepatic biliary  dilatation. The distal portion of the common bile duct is obscured by  overlying bowel gas.    Liver: Demonstrates normal parenchymal echogenicity. No focal hepatic  mass is visualized.    Pancreas:  Partially obscured by overlying bowel gas,  but grossly  unremarkable.     Right kidney:  No hydronephrosis or shadowing calculi.    Aorta and IVC:  Not specifically assessed.       Impression    IMPRESSION:  Cholelithiasis with gallbladder wall thickening and  positive sonographic Martin's sign, findings are suspicious for  acute  cholecystitis.    NEDA CRUZ MD

## 2020-10-27 NOTE — PLAN OF CARE
Summary:     DATE & TIME: 10/27/20 4395-4841  Cognitive Concerns/ Orientation : A&Ox4   BEHAVIOR & AGGRESSION TOOL COLOR: Green  CIWA SCORE: NA  ABNL VS/O2: NA  MOBILITY: Ind  PAIN MANAGEMENT: PRN Dilaudid given for RUQ abdominal pain, effective  DIET: NPO ex ice chips  BOWEL/BLADDER: Continent  ABNL LAB/BG:  None new.  DRAIN/DEVICES: PIV L hand with IVF infusing @75 mL/hr  TELEMETRY RHYTHM: NA  SKIN: Intact  TESTS/PROCEDURES: General surgery consulted.  D/C DAY/GOALS/PLACE: Discharge pending progress.  OTHER IMPORTANT INFO: Continues on IV Zosyn.     Observation goals PRIOR TO DISCHARGE     Comments: -diagnostic tests and consults completed and resulted- Not met  -vital signs normal or at patient baseline- Met   -tolerating oral intake to maintain hydration- Not met, NPO   -returns to baseline functional status- Met   -- surgery consultation, intervention if indicated per surgery- Not met

## 2020-10-27 NOTE — DISCHARGE INSTRUCTIONS
[facility]  Discharge Instructions: Post-Operative Laparoscopic Cholecystectomy    ACTIVITY    Expect to feel tired after your surgery.  This will gradually resolve.      Take frequent, short walks and increase your activity gradually.      Avoid strenuous physical activity or heavy lifting greater than 15-20 lbs. for 2-3 weeks.  You may climb stairs.    You may drive without restrictions when you are not using any prescription pain medication and feel comfortable in a car.    You may return to work/school when you are comfortable without any prescription pain medication.    WOUND CARE    You may remove your outer dressing or Band-Aids and shower 48 hours after the surgery.  Pat your incisions dry and leave them open to air.  Re-apply dressing (Band-Aids or gauze/tape) as needed for comfort or drainage.    You may have steri-strips (looks like white tape) on your incision.  You may peel off the steri-strips 2 weeks after your surgery if they have not peeled off on their own.     Do not soak your incisions in a tub or pool for 2 weeks.     Do not apply any lotions, creams, or ointments to your incisions.    A ridge under your incisions is normal and will gradually resolve.    DIET    Start with liquids, then gradually resume your regular diet as tolerated.  Avoid heavy, spicy, and greasy meals for 2-3 days.    Drink plenty of fluids to stay hydrated.    It is not uncommon to experience some loose stools or diarrhea after surgery.  This is your body's way of adapting to the bile which will slowly drain into your intestine.  A low fat diet may help with this.  This should improve over 1-2 months.    PAIN    Expect some tenderness and discomfort at the incision sites.  Use the prescribed pain medication at your discretion.  Expect gradual resolution of your pain over several days.    You may take ibuprofen with food (unless you have been told not to) instead of or in addition to your  prescribed pain medication.  If you are taking Norco or Percocet, do not take any additional acetaminophen/Tylenol.    Do not drink alcohol or drive while you are taking pain medications.    You may apply ice to your incisions in 20 minute intervals as needed for the next 48 hours.  After that time, consider switching to heat if you prefer.    EXPECTATIONS    Pain medications can cause constipation.  Limit use when possible.  Take over the counter stool softener/stimulant, such as Colace or Senna, 1-2 times a day with plenty of water.  You may take a mild over the counter laxative, such as Miralax or a suppository, as needed.  You may discontinue these medications once you are having regular bowel movements and/or are no longer taking your narcotic pain medication.      You may have shoulder or upper back discomfort due to the gas used in surgery.  This is temporary and should resolve in 48-72 hours.  Short, frequent walks may help with this.    If you are unable to urinate, or feel as though you are not emptying your bladder adequately, we recommend you call our office and/or seek care at an ER or Urgent Care facility if after hours.    FOLLOW UP    Our office will contact you in approximately 2 weeks to check on your progress and answer any questions you may have.  If you are doing well, you will not need to return for a follow up appointment.  If any concerns are identified over the phone, we will help you make an appointment to see a provider.     If you have not received a phone call, have any questions or concerns, or would like to be seen, please call us at [phone] and ask to speak with our nurse.  We are located at [address] [place] [address].    CALL OUR OFFICE -[phone] IF YOU HAVE:     Chills or fever above 101 F.    Increased redness, warmth, or drainage at your incisions.    Significant bleeding.    Pain not relieved by your pain medication or rest.    Increasing pain  after the first 48 hours.    Any other concerns or questions.                      Revised September 2020

## 2020-10-27 NOTE — ED NOTES
"[facility]  ED Nurse Handoff Report    ED Chief complaint: Abdominal Pain      ED Diagnosis:   Final diagnoses:   Acute cholecystitis   RUQ abdominal pain       Code Status: Full Code    Allergies: No Known Allergies    Patient Story: abdominal pain  Focused Assessment:  Patient presents to ED after outpatient CT scan showed gall bladder issues. US showed acute cholecystitis.  Treatments and/or interventions provided: Zosyn, Toradol  Patient's response to treatments and/or interventions: Pain has improved    To be done/followed up on inpatient unit:  pre op     Does this patient have any cognitive concerns?: na    Activity level - Baseline/Home:  Independent  Activity Level - Current:   Independent    Patient's Preferred language: English   Needed?: No    Isolation: None  Infection: Not Applicable  Patient tested for COVID 19 prior to admission: YES  Bariatric?: No    Vital Signs:   Vitals:    10/26/20 1711   BP: 118/81   Resp: 18   Temp: 98.6  F (37  C)   TempSrc: Oral   SpO2: 100%   Weight: 72.6 kg (160 lb)   Height: 1.702 m (5' 7\")       Cardiac Rhythm:     Was the PSS-3 completed:   Yes  What interventions are required if any?               Family Comments: na  OBS brochure/video discussed/provided to patient/family: Yes              Name of person given brochure if not patient: na              Relationship to patient: na    For the majority of the shift this patient's behavior was Green.   Behavioral interventions performed were na.    ED NURSE PHONE NUMBER: na         "

## 2020-10-27 NOTE — BRIEF OP NOTE
General Surgery Brief Operative Note    Pre-operative diagnosis: Acute cholecystitis [K81.0]   Post-operative diagnosis Same   Procedure: Procedure(s):  CHOLECYSTECTOMY, LAPAROSCOPIC    Surgeon(s), Assistant(s): Surgeon(s) and Role:     * Bennie Rojas MD - Primary     * Nani Monroe PA-C - Assisting   Estimated blood loss: 7 mL   Drains: None   Specimens: ID Type Source Tests Collected by Time Destination   A : Gallbladder and Contents Tissue Gallbladder and Contents SURGICAL PATHOLOGY EXAM Bennie Rojas MD 10/27/2020 10:41 AM       Findings: See postop diagnosis   Condition: Stable   Comments:      Nani Monroe PA-C See dictated operative report for full details  Can go home later today from surgery standpoint

## 2020-10-28 LAB — COPATH REPORT: NORMAL

## 2020-11-16 ENCOUNTER — TELEPHONE (OUTPATIENT)
Dept: SURGERY | Facility: CLINIC | Age: 40
End: 2020-11-16

## 2020-11-16 NOTE — TELEPHONE ENCOUNTER
[facility]  Post op call note     Stephie Badillo Vargas was called for an update regarding her recovery.  She underwent a laparoscopic cholecystectomy by Dr. Rojas on 10/27/20.  Today she tells me she is doing well and denies any complaints.  She states her wounds are healing well. She is eating a normal diet and her bowels are regular.       The pathology revealed acute and chronic cholecystitis and cholelithiasis..  This was discussed with the patient.     She was instructed to continue to keep her wounds clean and dry and gradually resume all normal activities. The patient states she understands our discussion and all of her questions were answered.  The patient agrees to follow up in clinic as needed.      Nani Monroe PA-C

## 2023-11-27 NOTE — ANESTHESIA CARE TRANSFER NOTE
Patient: Stephie Badillo Vargas    Procedure(s):  CHOLECYSTECTOMY, LAPAROSCOPIC    Diagnosis: Acute cholecystitis [K81.0]  Diagnosis Additional Information: No value filed.    Anesthesia Type:   General     Note:  Airway :Face Mask  Patient transferred to:PACU  Comments: Pt exhibits spont resps, all monitors and alarms on in pacu, report given to RN, vss.Handoff Report: Identifed the Patient, Identified the Reponsible Provider, Reviewed the pertinent medical history, Discussed the surgical course, Reviewed Intra-OP anesthesia mangement and issues during anesthesia, Set expectations for post-procedure period and Allowed opportunity for questions and acknowledgement of understanding      Vitals: (Last set prior to Anesthesia Care Transfer)    CRNA VITALS  10/27/2020 1035 - 10/27/2020 1111      10/27/2020             Pulse:  96    SpO2:  97 %    Resp Rate (observed):  24    Resp Rate (set):  10                Electronically Signed By: MARIA ESTHER Hurd CRNA  October 27, 2020  11:11 AM   Received voicemail from patient asking if her jak2 test was authporized yet. I do not see that our PEC has obtained the authorization. Will reach out to [facility] and call the patient back with an update.

## 2024-12-26 PROBLEM — E78.5 HYPERLIPIDEMIA, UNSPECIFIED: Status: ACTIVE | Noted: 2023-02-09

## 2024-12-26 PROBLEM — Z83.719 FAMILY HISTORY OF COLONIC POLYPS: Status: ACTIVE | Noted: 2023-02-09

## 2024-12-26 PROBLEM — Z80.3 FAMILY HISTORY OF MALIGNANT NEOPLASM OF BREAST IN FIRST DEGREE RELATIVE: Status: ACTIVE | Noted: 2023-02-09

## 2024-12-27 PROBLEM — E78.5 HYPERLIPIDEMIA, UNSPECIFIED: Status: RESOLVED | Noted: 2023-02-09 | Resolved: 2024-12-27

## 2024-12-27 PROBLEM — R10.11 RUQ ABDOMINAL PAIN: Status: RESOLVED | Noted: 2020-10-26 | Resolved: 2024-12-27

## 2024-12-27 PROBLEM — K81.0 ACUTE CHOLECYSTITIS: Status: RESOLVED | Noted: 2020-10-26 | Resolved: 2024-12-27

## 2024-12-31 DIAGNOSIS — E78.1 HYPERTRIGLYCERIDEMIA: Primary | ICD-10-CM

## 2024-12-31 LAB
CHOLEST SERPL-MCNC: 241 MG/DL
FASTING STATUS PATIENT QL REPORTED: YES
HDLC SERPL-MCNC: 52 MG/DL
LDLC SERPL CALC-MCNC: 140 MG/DL
NONHDLC SERPL-MCNC: 189 MG/DL
TRIGL SERPL-MCNC: 246 MG/DL

## 2024-12-31 PROCEDURE — 80061 LIPID PANEL: CPT | Mod: QW

## 2024-12-31 PROCEDURE — 36415 COLL VENOUS BLD VENIPUNCTURE: CPT

## 2025-03-04 ENCOUNTER — TRANSFERRED RECORDS (OUTPATIENT)
Dept: FAMILY MEDICINE | Facility: CLINIC | Age: 45
End: 2025-03-04

## 2025-03-28 ENCOUNTER — HOSPITAL ENCOUNTER (OUTPATIENT)
Dept: CARDIOLOGY | Facility: CLINIC | Age: 45
Discharge: HOME | End: 2025-03-28
Attending: FAMILY MEDICINE | Admitting: FAMILY MEDICINE
Payer: COMMERCIAL

## 2025-03-28 DIAGNOSIS — E78.00 HYPERCHOLESTEREMIA: ICD-10-CM

## 2025-03-28 PROCEDURE — 75571 CT HRT W/O DYE W/CA TEST: CPT

## 2025-03-28 PROCEDURE — 75571 CT HRT W/O DYE W/CA TEST: CPT | Mod: 26 | Performed by: INTERNAL MEDICINE

## 2025-04-17 ENCOUNTER — TRANSFERRED RECORDS (OUTPATIENT)
Dept: FAMILY MEDICINE | Facility: CLINIC | Age: 45
End: 2025-04-17

## 2025-05-15 ENCOUNTER — OFFICE VISIT (OUTPATIENT)
Dept: FAMILY MEDICINE | Facility: CLINIC | Age: 45
End: 2025-05-15

## 2025-05-15 VITALS
HEART RATE: 67 BPM | BODY MASS INDEX: 26.55 KG/M2 | WEIGHT: 175.2 LBS | DIASTOLIC BLOOD PRESSURE: 76 MMHG | SYSTOLIC BLOOD PRESSURE: 115 MMHG | HEIGHT: 68 IN | OXYGEN SATURATION: 98 %

## 2025-05-15 DIAGNOSIS — F41.1 GAD (GENERALIZED ANXIETY DISORDER): ICD-10-CM

## 2025-05-15 DIAGNOSIS — Z78.0 MENOPAUSE: Primary | ICD-10-CM

## 2025-05-15 PROCEDURE — 99214 OFFICE O/P EST MOD 30 MIN: CPT | Performed by: FAMILY MEDICINE

## 2025-05-15 PROCEDURE — G2211 COMPLEX E/M VISIT ADD ON: HCPCS | Performed by: FAMILY MEDICINE

## 2025-05-15 PROCEDURE — 3074F SYST BP LT 130 MM HG: CPT | Performed by: FAMILY MEDICINE

## 2025-05-15 PROCEDURE — 3078F DIAST BP <80 MM HG: CPT | Performed by: FAMILY MEDICINE

## 2025-05-15 RX ORDER — ESTRADIOL 0.05 MG/D
1 PATCH, EXTENDED RELEASE TRANSDERMAL
Qty: 24 PATCH | Refills: 0 | Status: SHIPPED | OUTPATIENT
Start: 2025-05-15 | End: 2025-08-13

## 2025-05-15 NOTE — LETTER
5/15/2025      Stephie Badillo Vargas  [address]  [place] [address]    This patient requires a medical massage therapy for management of joint pain. 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    Recommended frequency of treatments : Once a month for 90 minutes        Sincerely,        Deysi Armstrong MD    Electronically signed

## 2025-05-15 NOTE — PROGRESS NOTES
"SUBJECTIVE:    Stephie Kaye, is a 45 year old female presenting for the below:     Answers submitted by the patient for this visit:  General Questionnaire (Submitted on 5/15/2025)  Chief Complaint: Chronic problems general questions HPI Form  What is the reason for your visit today? : menopause    IUD in situ. Placed age 38. Amenorrheic on this.     Joint pains. Shoulders knees feet. Continues to work out. Fatigue.No hot flashes. Night sweats. No vaginal dryness. Irritability. Weight gain. Current heaviest weight.     Mother breast cancer in 30, Gene testing negative.     OBJECTIVE:  Vitals:    05/15/25 1018   BP: 115/76   Pulse: 67   SpO2: 98%   Weight: 79.5 kg (175 lb 3.2 oz)   Height: 1.727 m (5' 8\")    Body mass index is 26.64 kg/m .  General: no acute distress, cooperative with exam.  Psych: mental status normal, mood and affect appropriate.      ASSESSMENT / PLAN:      Menopause  HRT discussed. Mechanism of action, common side effects and how to take discussed.     Experiencing vasomotor symptoms.     No known contraindications to HRT: personal h/o breast cancer, CAD, previous thromboembolic event, stroke/TIA, active liver disease, unexplained vaginal bleeding, endometrial cancer.     Use FDA approved bioidentical hormone therapy : 18-beta estradiol. Has IUD  in place for endometrial protection (coming up due for exchange : will schedule for this)    -     estradiol (VIVELLE-DOT) 0.05 MG/24HR bi-weekly patch; Place 1 patch over 96 hours onto the skin twice a week.    -follow up 4 weeks.     GEORGETTE (generalized anxiety disorder)  After discussion, increase Zoloft 50 --> 100 mg daily for irritability   -     sertraline (ZOLOFT) 50 MG tablet; Take 2 tablets (100 mg) by mouth daily.    Follow up:  Appointments in Next Year      Jun 17, 2025 10:00 AM  (Arrive by 9:45 AM)  Office Visit with Deysi Armstrong MD  [facility] [phone]     The longitudinal plan of care for " the diagnosis(es)/condition(s) as documented were addressed during this visit. Due to the added complexity in care, I will continue to support Stephie in the subsequent management and with ongoing continuity of care.

## 2025-05-29 ENCOUNTER — TRANSFERRED RECORDS (OUTPATIENT)
Dept: FAMILY MEDICINE | Facility: CLINIC | Age: 45
End: 2025-05-29

## 2025-06-17 ENCOUNTER — OFFICE VISIT (OUTPATIENT)
Dept: FAMILY MEDICINE | Facility: CLINIC | Age: 45
End: 2025-06-17

## 2025-06-17 VITALS
HEART RATE: 74 BPM | BODY MASS INDEX: 26.79 KG/M2 | DIASTOLIC BLOOD PRESSURE: 68 MMHG | WEIGHT: 176.2 LBS | SYSTOLIC BLOOD PRESSURE: 108 MMHG | OXYGEN SATURATION: 97 %

## 2025-06-17 DIAGNOSIS — Z78.0 MENOPAUSE: Primary | ICD-10-CM

## 2025-06-17 DIAGNOSIS — Z30.432 ENCOUNTER FOR IUD REMOVAL: ICD-10-CM

## 2025-06-17 DIAGNOSIS — Z30.430 ENCOUNTER FOR INSERTION OF INTRAUTERINE CONTRACEPTIVE DEVICE: ICD-10-CM

## 2025-06-17 DIAGNOSIS — Z12.4 CERVICAL CANCER SCREENING: ICD-10-CM

## 2025-06-17 PROCEDURE — 87624 HPV HI-RISK TYP POOLED RSLT: CPT | Performed by: FAMILY MEDICINE

## 2025-06-17 PROCEDURE — 3078F DIAST BP <80 MM HG: CPT | Performed by: FAMILY MEDICINE

## 2025-06-17 PROCEDURE — 58301 REMOVE INTRAUTERINE DEVICE: CPT | Mod: 51 | Performed by: FAMILY MEDICINE

## 2025-06-17 PROCEDURE — 99213 OFFICE O/P EST LOW 20 MIN: CPT | Mod: 25 | Performed by: FAMILY MEDICINE

## 2025-06-17 PROCEDURE — 3074F SYST BP LT 130 MM HG: CPT | Performed by: FAMILY MEDICINE

## 2025-06-17 PROCEDURE — 58300 INSERT INTRAUTERINE DEVICE: CPT | Performed by: FAMILY MEDICINE

## 2025-06-17 PROCEDURE — G2211 COMPLEX E/M VISIT ADD ON: HCPCS | Performed by: FAMILY MEDICINE

## 2025-06-17 NOTE — PATIENT INSTRUCTIONS
"Call insurance and ask about coverage of Saxsenda, Wegovy and Zepbound for management of obesity.     If they say, yes its covered ask if a  'prior authorization\" will be needed.     If they say yes, ask what the requirements that need to be met in the [place] to have coverage.     May sure they are offering you coverage for weight loss, not type 2 diabetes.     FDA approved for BMI 27 or above with co morbidity (ie hypercholesteremia) of excess body weight or BMI more than 30      COST AND COVERAGE:      Alternatives for those whos insurance does not cover injectable medications for weight loss :    1) - [facility] to obtain trizepatide (the drug in Zepbound) in VIALS, for a lower cost than the trade named PENS without insurance coverage. It's still a lot of money. Up to date costs can be found on this website:    [email or web address]    Zepbound PENS : $650/month for 2.5mg-15mg at any pharmacy or [facility] using the savings card.  Zepbound VIALS : $349/month for 2.5mg $499/month for 5mg-10mg    2) - Wegovy (the makers of semaglutide) have also started offering the wegovy pens for $499 per month through [facility] or your local pharmacy with a savings card for those who's insurance does not cover these medications.     [email or web address] [email or web address]-[email or web address]-wegovy.[email or web address]    3) -Some patients obtain compounded semaglutide through [employer] or [employer] or one of the other on line distributors of the compounded products. I cannot vouch for their standards or the product they are issuing and also question how they are able to continue producing compounded weight loss medications when [place] has been informed they have to cease.    None the less, [employer] have a great page explaining the costs and coverage of semaglutide (trade name Wegovy).    [email or web address]      Let me know how you would like to consider moving " forward.            Compounded sublingual semaglutide : around $200 per month      Subcutaneous -weekly Sublingual (2mg/mL) -daily   0.25 mg  0.5 mg (0.25 mL)   0.5 mg 1 mg (0.5 mL)   1 mg  1.5 mg (0.75 mL)   1.7 mg 2 mg (1 mL)   2.4 mg 2.5 mg (1.25 mL)     Compounding Pharmacy recommendation: If converting from injection, ideally step down 1-2 doses in sublingual, then titrate as appropriate.    Why the sublingual route?  -Sublingual route avoids the gastrointestinal tract (stomach acid and liver metabolism aka   first-pass effect ) and goes directly into the bloodstream through mucosal tissues.    How is sublingual semaglutide made?  -Sublingual semaglutide is made from commercially available tablets and combined with a  specially designed compounding sublingual base, SubMagna.  -SubMagna base reacts with saliva to produce an emulsion, which enhances the solubility  and dispersibility of medication. Its unique mucoadhesive properties prolong the contact  time between the medication and the mucosal tissues, potentially allowing greater  absorption. SubMagna also has permeation-enhancing effects to help facilitate efficient  delivery of medication into the bloodstream.    What is the expiration date of the compounded product?  -This formulation allows for 90 days room temp stability    How is it used?  -Shake well, prior to each dose (it is VERY thick).  -Store at room temp.  -Hold under your tongue as long as you can to maximize absorption.  -If too much volume to handle, ok to divide into 2 doses about 5 minutes apart.  -Do not eat or drink for 30 minutes after the dose.    How long will sublingual semaglutide be available for compounding?  -Since sublingual semaglutide is made using commercially available tablets, it is not  subject to the FDA shortage list, so it's anticipated the availability will be on-going.      Semaglutide 2mg/ml  15ml= $181  30ml= $309  45ml= ?    Semaglutide  5mg/ml  10ml=$226  20ml=$398  30ml=$570

## 2025-06-17 NOTE — PROGRESS NOTES
IUD Intrauterine Device Insertion Procedure Note    PRE-OP DIAGNOSIS: desired long-term, reversible contraception     IUD type: Mirena     Pre procedure urine pregnancy test : exchanging IUD    Written consent was obtained for the procedure.     PROCEDURE:   A bimanual exam was performed to determine the position of the uterus.  The speculum was placed. The vagina and cervix was sterilized in the usual manner and sterile technique was maintained throughout the course of the procedure. A single toothed tenaculum was applied to the anterior lip of the cervix and gentle traction applied to straighten and  stabilize it. The depth of the uterus was sounded to a depth of 8.0. With gentle traction on the tenaculum, the  IUD was inserted to the appropriate depth and deposited by withdrawing on the insertion tube holding the jack steady.  The string was cut to an estimated 4 cm length. Bleeding was minimal. The patient tolerated the  procedure well.       Encounter for insertion of intrauterine contraceptive device  Follow up: Standard post-procedure care was explained and return precautions are given.  -     INSERTION INTRAUTERINE DEVICE

## 2025-06-17 NOTE — PROGRESS NOTES
IUD Device Removal Procedure Note    Verbal consent was obtained for the procedure.      PROCEDURE:   The speculum was placed. IUD string visualized and grasped with ringed forceps. IUD removed without resistance or issue.  The patient tolerated the procedure well.       Cervical cancer screening  -     HPV and Gynecologic Cytology Panel    Encounter for IUD removal  -     REMOVE INTRAUTERINE DEVICE    Followup: Standard post-procedure care was explained and return precautions are given.

## 2025-06-17 NOTE — PROGRESS NOTES
SUBJECTIVE:    Stephie Badillo Vargas, is a 45 year old female presenting for the below:     IUD in situ. Placed age 38. Amenorrheic on this (exchanged today) with estradiol (VIVELLE-DOT) 0.05 MG/24HR bi-weekly patch     Zoloft increased 50 --> 100 mg daily for irritability at last appointment.     OBJECTIVE:  Vitals:    06/17/25 1000   BP: 108/68   Pulse: 74   SpO2: 97%   Weight: 79.9 kg (176 lb 3.2 oz)    Body mass index is 26.79 kg/m .  General: no acute distress, cooperative with exam.  Psych: mental status normal, mood and affect appropriate.      ASSESSMENT / PLAN:      Menopause  Doing well on zoloft 100 mg daily.  Improvement in symptoms with estradiol (VIVELLE-DOT) 0.05 MG/24HR bi-weekly patch : continue current dose    BMI 26.0-26.9,adult  Offered medical weight management. Issued with intake form to complete. Will return with this for dedicated medical weight management first appointment.  -plan to perform baseline body composition analysis at initial appointment.   -pt will call insurance for coverage of GLP1/GIP  -discussed : FDA approved for BMI 27 or above with co morbidity (ie hypercholesteremia) of excess body weight or BMI more than 30      Follow up:  1st MWM appointment.

## 2025-06-18 ENCOUNTER — RESULTS FOLLOW-UP (OUTPATIENT)
Dept: FAMILY MEDICINE | Facility: CLINIC | Age: 45
End: 2025-06-18

## 2025-06-18 LAB
HPV HR 12 DNA CVX QL NAA+PROBE: NEGATIVE
HPV16 DNA CVX QL NAA+PROBE: NEGATIVE
HPV18 DNA CVX QL NAA+PROBE: NEGATIVE
HUMAN PAPILLOMA VIRUS FINAL DIAGNOSIS: NORMAL

## 2025-06-22 LAB
BKR AP ASSOCIATED HPV REPORT: NORMAL
BKR LAB AP GYN ADEQUACY: NORMAL
BKR LAB AP GYN INTERPRETATION: NORMAL
BKR LAB AP PREVIOUS ABNORMAL: NORMAL
PATH REPORT.COMMENTS IMP SPEC: NORMAL
PATH REPORT.COMMENTS IMP SPEC: NORMAL
PATH REPORT.RELEVANT HX SPEC: NORMAL

## 2025-07-02 ENCOUNTER — TRANSFERRED RECORDS (OUTPATIENT)
Dept: FAMILY MEDICINE | Facility: CLINIC | Age: 45
End: 2025-07-02

## (undated) DEVICE — SOL NACL 0.9% INJ 1000ML BAG 2B1324X

## (undated) DEVICE — ESU GROUND PAD UNIVERSAL W/O CORD

## (undated) DEVICE — SOL WATER IRRIG 1000ML BOTTLE 2F7114

## (undated) DEVICE — PACK LAP CHOLE SLC15LCFSD

## (undated) DEVICE — PREP CHLORAPREP 26ML TINTED ORANGE  260815

## (undated) DEVICE — ENDO SCOPE WARMER LF TM500

## (undated) DEVICE — GLOVE PROTEXIS BLUE W/NEU-THERA 7.5  2D73EB75

## (undated) DEVICE — SU MONOCRYL 4-0 PS-2 18" UND Y496G

## (undated) DEVICE — LINEN TOWEL PACK X5 5464

## (undated) DEVICE — SUCTION CANISTER MEDIVAC LINER 3000ML W/LID 65651-530

## (undated) DEVICE — SUCTION IRR STRYKERFLOW II W/TIP 250-070-520

## (undated) DEVICE — CLIP APPLIER ENDO 5MM M/L LIGAMAX EL5ML

## (undated) DEVICE — GLOVE PROTEXIS MICRO 7.5  2D73PM75

## (undated) DEVICE — ENDO TROCAR FIRST ENTRY KII FIOS Z-THRD 05X100MM CTF03

## (undated) DEVICE — ESU HOLDER LAP INST DISP PURPLE LONG 330MM H-PRO-330

## (undated) DEVICE — EVAC SYSTEM CLEAR FLOW SC082500

## (undated) DEVICE — SU VICRYL 0 UR-6 27" J603H

## (undated) DEVICE — ENDO TROCAR BLUNT TIP KII BALLOON 12X100MM C0R47

## (undated) DEVICE — ENDO TROCAR SLEEVE KII Z-THREADED 05X100MM CTS02

## (undated) RX ORDER — HYDROMORPHONE HYDROCHLORIDE 1 MG/ML
INJECTION, SOLUTION INTRAMUSCULAR; INTRAVENOUS; SUBCUTANEOUS
Status: DISPENSED
Start: 2020-10-27

## (undated) RX ORDER — NEOSTIGMINE METHYLSULFATE 1 MG/ML
VIAL (ML) INJECTION
Status: DISPENSED
Start: 2020-10-27

## (undated) RX ORDER — PROPOFOL 10 MG/ML
INJECTION, EMULSION INTRAVENOUS
Status: DISPENSED
Start: 2020-10-27

## (undated) RX ORDER — DEXAMETHASONE SODIUM PHOSPHATE 4 MG/ML
INJECTION, SOLUTION INTRA-ARTICULAR; INTRALESIONAL; INTRAMUSCULAR; INTRAVENOUS; SOFT TISSUE
Status: DISPENSED
Start: 2020-10-27

## (undated) RX ORDER — LIDOCAINE HYDROCHLORIDE 20 MG/ML
INJECTION, SOLUTION EPIDURAL; INFILTRATION; INTRACAUDAL; PERINEURAL
Status: DISPENSED
Start: 2020-10-27

## (undated) RX ORDER — BUPIVACAINE HYDROCHLORIDE AND EPINEPHRINE 2.5; 5 MG/ML; UG/ML
INJECTION, SOLUTION EPIDURAL; INFILTRATION; INTRACAUDAL; PERINEURAL
Status: DISPENSED
Start: 2020-10-27

## (undated) RX ORDER — GLYCOPYRROLATE 0.2 MG/ML
INJECTION, SOLUTION INTRAMUSCULAR; INTRAVENOUS
Status: DISPENSED
Start: 2020-10-27

## (undated) RX ORDER — FENTANYL CITRATE 50 UG/ML
INJECTION, SOLUTION INTRAMUSCULAR; INTRAVENOUS
Status: DISPENSED
Start: 2020-10-27

## (undated) RX ORDER — ONDANSETRON 2 MG/ML
INJECTION INTRAMUSCULAR; INTRAVENOUS
Status: DISPENSED
Start: 2020-10-27